# Patient Record
Sex: MALE | Race: WHITE | Employment: PART TIME | URBAN - METROPOLITAN AREA
[De-identification: names, ages, dates, MRNs, and addresses within clinical notes are randomized per-mention and may not be internally consistent; named-entity substitution may affect disease eponyms.]

---

## 2022-08-06 ENCOUNTER — APPOINTMENT (OUTPATIENT)
Dept: CT IMAGING | Age: 37
End: 2022-08-06
Payer: MEDICARE

## 2022-08-06 ENCOUNTER — HOSPITAL ENCOUNTER (EMERGENCY)
Age: 37
Discharge: HOME | End: 2022-08-06
Attending: STUDENT IN AN ORGANIZED HEALTH CARE EDUCATION/TRAINING PROGRAM
Payer: MEDICARE

## 2022-08-06 ENCOUNTER — APPOINTMENT (OUTPATIENT)
Dept: GENERAL RADIOLOGY | Age: 37
End: 2022-08-06
Payer: MEDICARE

## 2022-08-06 VITALS
OXYGEN SATURATION: 95 % | SYSTOLIC BLOOD PRESSURE: 141 MMHG | TEMPERATURE: 98.2 F | HEIGHT: 72 IN | RESPIRATION RATE: 18 BRPM | DIASTOLIC BLOOD PRESSURE: 98 MMHG | WEIGHT: 185 LBS | HEART RATE: 73 BPM | BODY MASS INDEX: 25.06 KG/M2

## 2022-08-06 DIAGNOSIS — R07.2 PRECORDIAL PAIN: Primary | ICD-10-CM

## 2022-08-06 DIAGNOSIS — F14.90 COCAINE USE: ICD-10-CM

## 2022-08-06 DIAGNOSIS — E87.6 HYPOKALEMIA: ICD-10-CM

## 2022-08-06 DIAGNOSIS — R10.13 ABDOMINAL PAIN, EPIGASTRIC: ICD-10-CM

## 2022-08-06 LAB
A/G RATIO: 2.2 (ref 1.1–2.2)
ALBUMIN SERPL-MCNC: 5.4 G/DL (ref 3.4–5)
ALP BLD-CCNC: 84 U/L (ref 40–129)
ALT SERPL-CCNC: 14 U/L (ref 10–40)
AMPHETAMINE SCREEN, URINE: ABNORMAL
ANION GAP SERPL CALCULATED.3IONS-SCNC: 23 MMOL/L (ref 3–16)
AST SERPL-CCNC: 17 U/L (ref 15–37)
BARBITURATE SCREEN URINE: ABNORMAL
BASE EXCESS VENOUS: -3.2 MMOL/L (ref -2–3)
BASOPHILS ABSOLUTE: 0.3 K/UL (ref 0–0.2)
BASOPHILS RELATIVE PERCENT: 2.6 %
BENZODIAZEPINE SCREEN, URINE: ABNORMAL
BILIRUB SERPL-MCNC: 0.6 MG/DL (ref 0–1)
BUN BLDV-MCNC: 10 MG/DL (ref 7–20)
CALCIUM SERPL-MCNC: 10.3 MG/DL (ref 8.3–10.6)
CANNABINOID SCREEN URINE: POSITIVE
CARBOXYHEMOGLOBIN: 3.8 % (ref 0–1.5)
CHLORIDE BLD-SCNC: 103 MMOL/L (ref 99–110)
CO2: 16 MMOL/L (ref 21–32)
COCAINE METABOLITE SCREEN URINE: POSITIVE
CREAT SERPL-MCNC: 0.9 MG/DL (ref 0.9–1.3)
EOSINOPHILS ABSOLUTE: 0 K/UL (ref 0–0.6)
EOSINOPHILS RELATIVE PERCENT: 0.1 %
GFR AFRICAN AMERICAN: >60
GFR NON-AFRICAN AMERICAN: >60
GLUCOSE BLD-MCNC: 118 MG/DL (ref 70–99)
HCO3 VENOUS: 18.2 MMOL/L (ref 24–28)
HCT VFR BLD CALC: 47.1 % (ref 40.5–52.5)
HEMOGLOBIN, VEN, REDUCED: 3.8 %
HEMOGLOBIN: 16.2 G/DL (ref 13.5–17.5)
INFLUENZA A: NOT DETECTED
INFLUENZA B: NOT DETECTED
LACTIC ACID: 1.4 MMOL/L (ref 0.4–2)
LACTIC ACID: 7.7 MMOL/L (ref 0.4–2)
LIPASE: 85 U/L (ref 13–60)
LYMPHOCYTES ABSOLUTE: 1.7 K/UL (ref 1–5.1)
LYMPHOCYTES RELATIVE PERCENT: 14.3 %
Lab: ABNORMAL
MCH RBC QN AUTO: 28.3 PG (ref 26–34)
MCHC RBC AUTO-ENTMCNC: 34.4 G/DL (ref 31–36)
MCV RBC AUTO: 82.4 FL (ref 80–100)
METHADONE SCREEN, URINE: ABNORMAL
METHEMOGLOBIN VENOUS: 0 % (ref 0–1.5)
MONOCYTES ABSOLUTE: 0.4 K/UL (ref 0–1.3)
MONOCYTES RELATIVE PERCENT: 3.4 %
NEUTROPHILS ABSOLUTE: 9.4 K/UL (ref 1.7–7.7)
NEUTROPHILS RELATIVE PERCENT: 79.6 %
O2 SAT, VEN: 96 %
OPIATE SCREEN URINE: ABNORMAL
OXYCODONE URINE: ABNORMAL
PCO2, VEN: 24.9 MMHG (ref 41–51)
PDW BLD-RTO: 14.2 % (ref 12.4–15.4)
PH UA: 6
PH VENOUS: 7.47 (ref 7.35–7.45)
PHENCYCLIDINE SCREEN URINE: ABNORMAL
PLATELET # BLD: 312 K/UL (ref 135–450)
PMV BLD AUTO: 7.3 FL (ref 5–10.5)
PO2, VEN: 70.9 MMHG (ref 25–40)
POTASSIUM SERPL-SCNC: 3.3 MMOL/L (ref 3.5–5.1)
PROPOXYPHENE SCREEN: ABNORMAL
RBC # BLD: 5.72 M/UL (ref 4.2–5.9)
SALICYLATE, SERUM: <0.3 MG/DL (ref 15–30)
SARS-COV-2 RNA, RT PCR: NOT DETECTED
SODIUM BLD-SCNC: 142 MMOL/L (ref 136–145)
TCO2 CALC VENOUS: 19 MMOL/L
TOTAL CK: 81 U/L (ref 39–308)
TOTAL PROTEIN: 7.9 G/DL (ref 6.4–8.2)
TROPONIN: <0.01 NG/ML
TROPONIN: <0.01 NG/ML
WBC # BLD: 11.8 K/UL (ref 4–11)

## 2022-08-06 PROCEDURE — 82550 ASSAY OF CK (CPK): CPT

## 2022-08-06 PROCEDURE — 87636 SARSCOV2 & INF A&B AMP PRB: CPT

## 2022-08-06 PROCEDURE — 71045 X-RAY EXAM CHEST 1 VIEW: CPT

## 2022-08-06 PROCEDURE — 93005 ELECTROCARDIOGRAM TRACING: CPT | Performed by: STUDENT IN AN ORGANIZED HEALTH CARE EDUCATION/TRAINING PROGRAM

## 2022-08-06 PROCEDURE — 80053 COMPREHEN METABOLIC PANEL: CPT

## 2022-08-06 PROCEDURE — 82803 BLOOD GASES ANY COMBINATION: CPT

## 2022-08-06 PROCEDURE — 83690 ASSAY OF LIPASE: CPT

## 2022-08-06 PROCEDURE — 6370000000 HC RX 637 (ALT 250 FOR IP): Performed by: STUDENT IN AN ORGANIZED HEALTH CARE EDUCATION/TRAINING PROGRAM

## 2022-08-06 PROCEDURE — 36415 COLL VENOUS BLD VENIPUNCTURE: CPT

## 2022-08-06 PROCEDURE — 83605 ASSAY OF LACTIC ACID: CPT

## 2022-08-06 PROCEDURE — 74177 CT ABD & PELVIS W/CONTRAST: CPT

## 2022-08-06 PROCEDURE — 99285 EMERGENCY DEPT VISIT HI MDM: CPT

## 2022-08-06 PROCEDURE — 80179 DRUG ASSAY SALICYLATE: CPT

## 2022-08-06 PROCEDURE — 2580000003 HC RX 258: Performed by: STUDENT IN AN ORGANIZED HEALTH CARE EDUCATION/TRAINING PROGRAM

## 2022-08-06 PROCEDURE — 85025 COMPLETE CBC W/AUTO DIFF WBC: CPT

## 2022-08-06 PROCEDURE — 6360000004 HC RX CONTRAST MEDICATION: Performed by: STUDENT IN AN ORGANIZED HEALTH CARE EDUCATION/TRAINING PROGRAM

## 2022-08-06 PROCEDURE — 84484 ASSAY OF TROPONIN QUANT: CPT

## 2022-08-06 PROCEDURE — 80307 DRUG TEST PRSMV CHEM ANLYZR: CPT

## 2022-08-06 RX ORDER — SODIUM CHLORIDE, SODIUM LACTATE, POTASSIUM CHLORIDE, AND CALCIUM CHLORIDE .6; .31; .03; .02 G/100ML; G/100ML; G/100ML; G/100ML
1000 INJECTION, SOLUTION INTRAVENOUS ONCE
Status: COMPLETED | OUTPATIENT
Start: 2022-08-06 | End: 2022-08-06

## 2022-08-06 RX ORDER — POTASSIUM CHLORIDE 20 MEQ/1
40 TABLET, EXTENDED RELEASE ORAL ONCE
Status: COMPLETED | OUTPATIENT
Start: 2022-08-06 | End: 2022-08-06

## 2022-08-06 RX ORDER — FAMOTIDINE 20 MG/1
20 TABLET, FILM COATED ORAL ONCE
Status: COMPLETED | OUTPATIENT
Start: 2022-08-06 | End: 2022-08-06

## 2022-08-06 RX ORDER — SODIUM CHLORIDE, SODIUM LACTATE, POTASSIUM CHLORIDE, AND CALCIUM CHLORIDE .6; .31; .03; .02 G/100ML; G/100ML; G/100ML; G/100ML
500 INJECTION, SOLUTION INTRAVENOUS ONCE
Status: COMPLETED | OUTPATIENT
Start: 2022-08-06 | End: 2022-08-06

## 2022-08-06 RX ORDER — POTASSIUM CHLORIDE 1.5 G/1.77G
40 POWDER, FOR SOLUTION ORAL ONCE
Status: DISCONTINUED | OUTPATIENT
Start: 2022-08-06 | End: 2022-08-06

## 2022-08-06 RX ORDER — ASPIRIN 81 MG/1
324 TABLET, CHEWABLE ORAL ONCE
Status: COMPLETED | OUTPATIENT
Start: 2022-08-06 | End: 2022-08-06

## 2022-08-06 RX ADMIN — FAMOTIDINE 20 MG: 20 TABLET ORAL at 18:09

## 2022-08-06 RX ADMIN — SODIUM CHLORIDE, SODIUM LACTATE, POTASSIUM CHLORIDE, AND CALCIUM CHLORIDE 1000 ML: .6; .31; .03; .02 INJECTION, SOLUTION INTRAVENOUS at 14:21

## 2022-08-06 RX ADMIN — IOPAMIDOL 75 ML: 755 INJECTION, SOLUTION INTRAVENOUS at 15:15

## 2022-08-06 RX ADMIN — SODIUM CHLORIDE, SODIUM LACTATE, POTASSIUM CHLORIDE, AND CALCIUM CHLORIDE 500 ML: .6; .31; .03; .02 INJECTION, SOLUTION INTRAVENOUS at 13:27

## 2022-08-06 RX ADMIN — ASPIRIN 324 MG: 81 TABLET, CHEWABLE ORAL at 13:27

## 2022-08-06 RX ADMIN — POTASSIUM CHLORIDE 40 MEQ: 20 TABLET, EXTENDED RELEASE ORAL at 18:34

## 2022-08-06 ASSESSMENT — ENCOUNTER SYMPTOMS
ABDOMINAL PAIN: 0
COUGH: 0
VOMITING: 0
NAUSEA: 0
SORE THROAT: 0
SHORTNESS OF BREATH: 1

## 2022-08-06 NOTE — ED NOTES
Seizure precautions in place. Side rails padded on both sides of stretcher. Oxygen and suction available at head of bed. Will continue seizure precautions for patient safety.       Josh Asencio RN  08/06/22 1792

## 2022-08-06 NOTE — ED NOTES
Patient prepared for and ready to be discharged. Patient discharged at this time in no acute distress after verbalizing understanding of discharge instructions. Patient left after receiving After Visit Summary instructions.       Amadou Faulkner RN  08/06/22 4246

## 2022-08-06 NOTE — ED NOTES
Sitter at bedside due to pt rolling around and may climb out of bed.      Isabel Potts RN  08/06/22 [id number]

## 2022-08-06 NOTE — ED PROVIDER NOTES
1 [facility]  EMERGENCY DEPARTMENT ENCOUNTER          ATTENDING PHYSICIAN NOTE       Date of evaluation: 8/6/2022    Chief Complaint     Fever and Hypertension      History of Present Illness     Hugo Mchugh is a 43 y.o. male with history of HTN not currently on medication and opiate use disorder presenting with chest pain, trouble breathing and generalized body pain with fever. Patient reports he was in his normal state of health until today at 10 AM when he took a dose of Suboxone from a friend. Patient reports that he began feeling left sided chest pain and pressure with associated shortness of breath. Patient reports the pain is localized, denies radiation to back neck or arms, denies associated nausea, vomiting, diaphoresis. Denies prior episodes of similar pain. Denies exacerbating or alleviating factors. He also states that he has felt hot as if he had a fever and has burning on his skin with no associated rash. Patient states that he only took Suboxone and last used fentanyl 36 hours ago. He denies any alcohol use, cocaine use or other substance use. Denies ever using IV drugs. Denies any upper respiratory symptoms, abdominal or GI symptoms. Denies any known sick contacts. He reports being vaccinated but not boosted for COVID. Review of Systems     Review of Systems   Constitutional:  Positive for fever. Negative for chills. HENT:  Negative for congestion and sore throat. Eyes:  Negative for visual disturbance. Respiratory:  Positive for shortness of breath. Negative for cough. Cardiovascular:  Positive for chest pain. Negative for leg swelling. Gastrointestinal:  Negative for abdominal pain, nausea and vomiting. Genitourinary:  Negative for dysuria. Musculoskeletal:  Negative for arthralgias and myalgias. Skin:  Negative for rash. Neurological:  Negative for syncope. Psychiatric/Behavioral:  Negative for confusion.       Past Medical, Surgical, Family, and Social History     He has no past medical history on file. He has a past surgical history that includes Tonsillectomy and [place] tooth extraction. His family history is not on file. He reports that he has been smoking cigarettes. He has been smoking an average of .2 packs per day. He has never used smokeless tobacco. He reports current drug use. Drug: Opiates . He reports that he does not drink alcohol. Medications     Discharge Medication List as of 8/6/2022  5:44 PM        CONTINUE these medications which have NOT CHANGED    Details   ondansetron (ZOFRAN ODT) 4 MG disintegrating tablet Take 1 tablet by mouth every 8 hours as needed for Nausea, Disp-20 tablet, R-0      loperamide (IMODIUM) 2 MG capsule Take 2 mg by mouth 4 times daily as needed for Diarrhea      acetaminophen (TYLENOL) 500 MG tablet Take 500 mg by mouth every 6 hours as needed for Pain             Allergies     He has No Known Allergies. Physical Exam     INITIAL VITALS: BP: (!) 141/98, Temp: 100 °F (37.8 °C), Heart Rate: 93, Resp: 18, SpO2: 100 %   Physical Exam  GENERAL: Awake, alert. Uncomfortable appering, rolling around on stretcher kicking with eyes closed  HEENT: Normocephalic, atraumatic. Conjunctiva clear, EOMI, no eye drainage. External ear normal. Nares patent with no drainage. Mucous membranes moist. Neck is supple, with full ROM. CARDIOVASCULAR: RRR, extremities warm and appear well-perfused. no peripheral edema. Strong symmetric radial pulses  RESPIRATORY: No increased WOB, good air movement, breath sounds CTAB, no wheezes, rhonchi, or crackles noted. GI/ABDOMEN: Soft, non-distended, non-tender, normal bowel sounds, no rebound, no guarding. MSK/EXTR: No deformities or cyanosis noted. SKIN: Warm and dry, no rashes. NEURO: No focal neurologic deficits, moving all four extremities.  Alert and oriented  PSYCH: Normal affect, agitated with medical questions during interview, restless      Diagnostic Results     EKG   EKG reviewed and interpreted by me shows sinus arrhythmia with rate of 91, narrow QRS, normal axis with significant baseline as patient was not able to stay still during EKG with no significant ST elevations or depressions with prolonged QT interval.    RADIOLOGY:  CT ABDOMEN PELVIS W IV CONTRAST Additional Contrast? None   Final Result      Moderate nonspecific distention of proximal small bowel loops with air-fluid levels. Focal ileus or early mechanical obstruction or potential considerations. Otherwise no acute abnormality identified.           XR CHEST PORTABLE   Final Result      No acute disease          LABS:   Results for orders placed or performed during the hospital encounter of 08/06/22   COVID-19 & Influenza Combo    Specimen: Nasopharyngeal Swab   Result Value Ref Range    SARS-CoV-2 RNA, RT PCR NOT DETECTED NOT DETECTED    INFLUENZA A NOT DETECTED NOT DETECTED    INFLUENZA B NOT DETECTED NOT DETECTED   CBC with Auto Differential   Result Value Ref Range    WBC 11.8 (H) 4.0 - 11.0 K/uL    RBC 5.72 4.20 - 5.90 M/uL    Hemoglobin 16.2 13.5 - 17.5 g/dL    Hematocrit 47.1 40.5 - 52.5 %    MCV 82.4 80.0 - 100.0 fL    MCH 28.3 26.0 - 34.0 pg    MCHC 34.4 31.0 - 36.0 g/dL    RDW 14.2 12.4 - 15.4 %    Platelets 730 433 - 264 K/uL    MPV 7.3 5.0 - 10.5 fL    Neutrophils % 79.6 %    Lymphocytes % 14.3 %    Monocytes % 3.4 %    Eosinophils % 0.1 %    Basophils % 2.6 %    Neutrophils Absolute 9.4 (H) 1.7 - 7.7 K/uL    Lymphocytes Absolute 1.7 1.0 - 5.1 K/uL    Monocytes Absolute 0.4 0.0 - 1.3 K/uL    Eosinophils Absolute 0.0 0.0 - 0.6 K/uL    Basophils Absolute 0.3 (H) 0.0 - 0.2 K/uL   Troponin   Result Value Ref Range    Troponin <0.01 <0.01 ng/mL   CMP   Result Value Ref Range    Sodium 142 136 - 145 mmol/L    Potassium 3.3 (L) 3.5 - 5.1 mmol/L    Chloride 103 99 - 110 mmol/L    CO2 16 (L) 21 - 32 mmol/L    Anion Gap 23 (H) 3 - 16    Glucose 118 (H) 70 - 99 mg/dL    BUN 10 7 - 20 mg/dL    Creatinine 0.9 0.9 - 1.3 mg/dL    GFR Non-African American >60 >60    GFR African American >60 >60    Calcium 10.3 8.3 - 10.6 mg/dL    Total Protein 7.9 6.4 - 8.2 g/dL    Albumin 5.4 (H) 3.4 - 5.0 g/dL    Albumin/Globulin Ratio 2.2 1.1 - 2.2    Total Bilirubin 0.6 0.0 - 1.0 mg/dL    Alkaline Phosphatase 84 40 - 129 U/L    ALT 14 10 - 40 U/L    AST 17 15 - 37 U/L   Lactic Acid   Result Value Ref Range    Lactic Acid 7.7 (HH) 0.4 - 2.0 mmol/L   Blood gas, venous (Lab)   Result Value Ref Range    pH, Osmani 7.472 (H) 7.350 - 7.450    pCO2, Osmani 24.9 (L) 41.0 - 51.0 mmHg    pO2, Osmani 70.9 (H) 25.0 - 40.0 mmHg    HCO3, Venous 18.2 (L) 24.0 - 28.0 mmol/L    Base Excess, Osmani -3.2 (L) -2.0 - 3.0 mmol/L    O2 Sat, Osmani 96 Not established %    Carboxyhemoglobin 3.8 (H) 0.0 - 1.5 %    MetHgb, Osmani 0.0 0.0 - 1.5 %    TC02 (Calc), Osmani 19 mmol/L    Hemoglobin, Osmani, Reduced 3.80 %   Urine Drug Screen   Result Value Ref Range    Amphetamine Screen, Urine Neg Negative <1000ng/mL    Barbiturate Screen, Ur Neg Negative <200 ng/mL    Benzodiazepine Screen, Urine Neg Negative <200 ng/mL    Cannabinoid Scrn, Ur POSITIVE (A) Negative <50 ng/mL    Cocaine Metabolite Screen, Urine POSITIVE (A) Negative <300 ng/mL    Opiate Scrn, Ur Neg Negative <300 ng/mL    PCP Screen, Urine Neg Negative <25 ng/mL    Methadone Screen, Urine Neg Negative <300 ng/mL    Propoxyphene Scrn, Ur Neg Negative <300 ng/mL    Oxycodone Urine Neg Negative <100 ng/ml    pH, UA 6.0     Drug Screen Comment: see below    Lipase   Result Value Ref Range    Lipase 85.0 (H) 13.0 - 60.0 U/L   CK   Result Value Ref Range    Total CK 81 39 - 052 U/L   Salicylate   Result Value Ref Range    Salicylate, Serum <8.1 (L) 15.0 - 30.0 mg/dL   Lactic Acid   Result Value Ref Range    Lactic Acid 1.4 0.4 - 2.0 mmol/L   Troponin   Result Value Ref Range    Troponin <0.01 <0.01 ng/mL       ED BEDSIDE ULTRASOUND:  No results found.     RECENT VITALS:  BP:  (unable to get blood pressure on patient because it was too painful for him/ screamed to take it off before it was done reading blood pressure),Temp: 98.2 °F (36.8 °C), Heart Rate: 73, Resp: 18, SpO2: 95 %     Procedures       ED Course     Nursing Notes, Past Medical Hx, Past Surgical Hx, Social Hx,Allergies, and Family Hx were reviewed. ED Course as of 08/07/22 0222   Sat Aug 06, 2022   1322 WBC(!): 11.8 [ED]   1322 Hemoglobin Quant: 16.2 [ED]   1322 Platelet Count: 601 [ED]   1327 pH, Osmani(!): 7.472 [ED]   9660 Carboxyhemoglobin(!): 3.8  Patient smokes tobacco daily [ED]   1351 Troponin: <0.01 [ED]   1351 Lactic Acid(!!): 7.7 [ED]   1351 Lipase(!): 85.0 [ED]   1352 Anion Gap(!): 23 [ED]   1352 Creatinine: 0.9 [ED]   1353 Alk Phos: 84 [ED]   7021 ALT: 14 [ED]   6654 AST: 17 [ED]   1429 Total CK: 81 [ED]   1444 Lipase(!): 61.0 [ED]   0158 Salicylate, Serum(!): <9.9 [ED]   1551 Lactic Acid: 1.4 [ED]   1737 Cannabinoid Scrn, Ur(!): POSITIVE [ED]   1737 Cocaine Metabolite Screen, Urine(!): POSITIVE [ED]      ED Course User Index  [ED] Alireza Carrera MD       patient was given the following medications:  Orders Placed This Encounter   Medications    aspirin chewable tablet 324 mg    lactated ringers bolus    lactated ringers bolus    iopamidol (ISOVUE-370) 76 % injection 75 mL    DISCONTD: potassium chloride (KLOR-CON) packet 40 mEq    famotidine (PEPCID) tablet 20 mg    DISCONTD: potassium bicarb-citric acid (EFFER-K) effervescent tablet 40 mEq    potassium chloride (KLOR-CON M) extended release tablet 40 mEq       CONSULTS:  None    MEDICAL DECISIONMAKING / ASSESSMENT / Godwin Grace is a 43 y.o. male with history of HTN, tobacco use and opiate use disorder presenting with chest pain, trouble breathing and fever. Uncomfortable appearing male in no acute distress. Differential concerning for but not limited to ACS, arrhythmia, COVID, pneumonia, pneumothorax, dissection, substance use, substance withdrawal, pancreatitis.     IV access was obtained and labs EKG and chest x-ray were ordered. Patient was given aspirin on arrival due to ongoing chest discomfort. Patient given IV fluids for her tachycardia. Labs notable for initial lactate of 7.7 with repeat after fluids of 1.4, UDS positive for cocaine and cannabis, troponin negative x2, COVID and flu negative. Mild hypokalemia to 3.3 for which patient was given oral potassium supplementation. Aspirin level was sent due to mixed acid-base status with concern for ingestion and resulted as negative. Mildly elevated lipase with epigastric discomfort, CT scan was obtained and negative for acute abnormality. Repeat assessment, patient had resolution of symptoms with significant improvement in heart rate and appeared comfortable seated in stretcher. Results were discussed with the patient given that he was now well-appearing with stable vitals, reassuring exam, improvement in his lactate with reassuring cardiac evaluation and heart score of 2, patient was discharged home with recommendation to follow-up with his primary care doctor for repeat assessment of his potassium and for repeat assessment of abdomen. He was advised to return to the ER for new or worsening symptoms. Patient expressed understanding and was amenable to plan. All questions were answered prior to discharge. Clinical Impression     1. Precordial pain    2. Abdominal pain, epigastric    3. Hypokalemia    4. Cocaine use        Disposition     PATIENT REFERRED TO:  No follow-up provider specified.     DISCHARGE MEDICATIONS:  Discharge Medication List as of 8/6/2022  5:44 PM          DISPOSITION Decision To Discharge 08/06/2022 06:36:59 PM        Elton Cardona MD  08/07/22 0222

## 2022-08-06 NOTE — DISCHARGE INSTRUCTIONS
-You were given fluids while in the emergency department with significant improvement in your symptoms, heart rate and lab tests  -The blood test to look for heart damage was negative in the emergency department  -CT scan of your abdomen did not show any acute abnormality  -Your potassium was slightly low in the emergency department and you were given oral potassium to increase it  -Please call the number on the back of your insurance card to set up follow-up appointment with a primary care doctor for repeat assessment of your potassium and abdominal pain  -Return to the ER for new or worsening symptoms including chest pain, trouble breathing, lightheadedness, syncope, worsening abdominal pain, signs of dehydration or other symptoms that are concerning to you

## 2022-08-07 LAB
EKG ATRIAL RATE: 91 BPM
EKG DIAGNOSIS: NORMAL
EKG P AXIS: 66 DEGREES
EKG P-R INTERVAL: 112 MS
EKG Q-T INTERVAL: 406 MS
EKG QRS DURATION: 76 MS
EKG QTC CALCULATION (BAZETT): 499 MS
EKG R AXIS: 79 DEGREES
EKG T AXIS: 60 DEGREES
EKG VENTRICULAR RATE: 91 BPM

## 2024-02-03 ENCOUNTER — HOSPITAL ENCOUNTER (EMERGENCY)
Age: 39
Discharge: HOME | End: 2024-02-03
Attending: EMERGENCY MEDICINE
Payer: MEDICAID

## 2024-02-03 VITALS
BODY MASS INDEX: 22.7 KG/M2 | WEIGHT: 171.3 LBS | HEART RATE: 74 BPM | TEMPERATURE: 98.6 F | SYSTOLIC BLOOD PRESSURE: 178 MMHG | OXYGEN SATURATION: 99 % | DIASTOLIC BLOOD PRESSURE: 98 MMHG | RESPIRATION RATE: 16 BRPM | HEIGHT: 73 IN

## 2024-02-03 DIAGNOSIS — I10 ASYMPTOMATIC HYPERTENSION: Primary | ICD-10-CM

## 2024-02-03 PROCEDURE — 93005 ELECTROCARDIOGRAM TRACING: CPT | Performed by: EMERGENCY MEDICINE

## 2024-02-03 PROCEDURE — 99283 EMERGENCY DEPT VISIT LOW MDM: CPT

## 2024-02-03 RX ORDER — METHADONE HYDROCHLORIDE 10 MG/1
80 TABLET ORAL DAILY
COMMUNITY

## 2024-02-03 ASSESSMENT — PAIN - FUNCTIONAL ASSESSMENT
PAIN_FUNCTIONAL_ASSESSMENT: NONE - DENIES PAIN
PAIN_FUNCTIONAL_ASSESSMENT: NONE - DENIES PAIN

## 2024-02-03 NOTE — ED PROVIDER NOTES
[facility] EMERGENCY DEPARTMENT  eMERGENCY dEPARTMENT eNCOUnter      Pt Name: Edvin Heck  MRN: [id number]  Birthdate 1985  Date of evaluation: 2/3/2024  Provider: Jamari Hernandez MD  PCP: [facility] JIL-DARRICK ARZATE      CHIEF COMPLAINT       Chief Complaint   Patient presents with    Hypertension       HISTORY OFPRESENT ILLNESS   (Location/Symptom, Timing/Onset, Context/Setting, Quality, Duration, Modifying Factors,Severity)  Note limiting factors.     Edvni Heck is a 38 y.o. male presents with complaints that his dad was concerned because his blood pressure was high and that his pulse was high he says that he supposed to be taking lisinopril but he does not take it he has not taken it for several weeks he denies any headache chest pain dizziness denies any palpitations    Nursing Notes were all reviewed and agreed with or any disagreements were addressed  in the HPI.    REVIEW OF SYSTEMS    (2-9 systems for level 4, 10 or more for level 5)     Review of Systems    Positives and Pertinent negatives as per HPI.  Except as noted above in the ROS, all other systems were reviewed andnegative.       PASTMEDICAL HISTORY   No past medical history on file.      SURGICAL HISTORY       Past Surgical History:   Procedure Laterality Date    TONSILLECTOMY      WISDOM TOOTH EXTRACTION           CURRENT MEDICATIONS       Previous Medications    ACETAMINOPHEN (TYLENOL) 500 MG TABLET    Take 1 tablet by mouth every 6 hours as needed for Pain    LOPERAMIDE (IMODIUM) 2 MG CAPSULE    Take 2 mg by mouth 4 times daily as needed for Diarrhea    METHADONE (DOLOPHINE) 10 MG TABLET    Take 8 tablets by mouth Daily. Max Daily Amount: 80 mg    ONDANSETRON (ZOFRAN ODT) 4 MG DISINTEGRATING TABLET    Take 1 tablet by mouth every 8 hours as needed for Nausea       ALLERGIES     Patient has no known allergies.    FAMILY HISTORY     No family history on file.       SOCIAL HISTORY       Social  MD  02/03/24 4125

## 2024-02-04 LAB
EKG ATRIAL RATE: 92 BPM
EKG DIAGNOSIS: NORMAL
EKG P AXIS: 58 DEGREES
EKG P-R INTERVAL: 128 MS
EKG Q-T INTERVAL: 380 MS
EKG QRS DURATION: 82 MS
EKG QTC CALCULATION (BAZETT): 469 MS
EKG R AXIS: 65 DEGREES
EKG T AXIS: 46 DEGREES
EKG VENTRICULAR RATE: 92 BPM

## 2024-02-04 NOTE — ED NOTES
Pt given AVS and verbalized understanding of instructions. Pt will follow up as indicated. Prescriptions given and medication education completed. Pt ambulated to lobby without assistance, respirations appear even and unlabored. No distress noted.

## 2024-02-05 PROCEDURE — 93010 ELECTROCARDIOGRAM REPORT: CPT | Performed by: INTERNAL MEDICINE

## 2024-08-07 ENCOUNTER — HOSPITAL ENCOUNTER (EMERGENCY)
Age: 39
Discharge: HOME | End: 2024-08-07
Attending: STUDENT IN AN ORGANIZED HEALTH CARE EDUCATION/TRAINING PROGRAM
Payer: MEDICAID

## 2024-08-07 ENCOUNTER — APPOINTMENT (OUTPATIENT)
Dept: GENERAL RADIOLOGY | Age: 39
End: 2024-08-07
Payer: MEDICAID

## 2024-08-07 VITALS
SYSTOLIC BLOOD PRESSURE: 152 MMHG | BODY MASS INDEX: 24.08 KG/M2 | RESPIRATION RATE: 17 BRPM | HEART RATE: 95 BPM | DIASTOLIC BLOOD PRESSURE: 102 MMHG | OXYGEN SATURATION: 99 % | WEIGHT: 181.66 LBS | TEMPERATURE: 97.8 F | HEIGHT: 73 IN

## 2024-08-07 DIAGNOSIS — K59.00 CONSTIPATION, UNSPECIFIED CONSTIPATION TYPE: Primary | ICD-10-CM

## 2024-08-07 LAB
ALBUMIN SERPL-MCNC: 4.3 G/DL (ref 3.4–5)
ALP SERPL-CCNC: 90 U/L (ref 40–129)
ALT SERPL-CCNC: 17 U/L (ref 10–40)
ANION GAP SERPL CALCULATED.3IONS-SCNC: 10 MMOL/L (ref 3–16)
AST SERPL-CCNC: 17 U/L (ref 15–37)
BASOPHILS # BLD: 0.1 K/UL (ref 0–0.2)
BASOPHILS NFR BLD: 0.9 %
BILIRUB DIRECT SERPL-MCNC: <0.2 MG/DL (ref 0–0.3)
BILIRUB INDIRECT SERPL-MCNC: NORMAL MG/DL (ref 0–1)
BILIRUB SERPL-MCNC: 0.4 MG/DL (ref 0–1)
BILIRUB UR QL STRIP.AUTO: NEGATIVE
BUN SERPL-MCNC: 9 MG/DL (ref 7–20)
CALCIUM SERPL-MCNC: 9.4 MG/DL (ref 8.3–10.6)
CHLORIDE SERPL-SCNC: 103 MMOL/L (ref 99–110)
CLARITY UR: CLEAR
CO2 SERPL-SCNC: 29 MMOL/L (ref 21–32)
COLOR UR: YELLOW
CREAT SERPL-MCNC: 0.8 MG/DL (ref 0.9–1.3)
DEPRECATED RDW RBC AUTO: 13.7 % (ref 12.4–15.4)
EKG ATRIAL RATE: 59 BPM
EKG DIAGNOSIS: NORMAL
EKG P AXIS: 61 DEGREES
EKG P-R INTERVAL: 128 MS
EKG Q-T INTERVAL: 448 MS
EKG QRS DURATION: 92 MS
EKG QTC CALCULATION (BAZETT): 443 MS
EKG R AXIS: 81 DEGREES
EKG T AXIS: 55 DEGREES
EKG VENTRICULAR RATE: 59 BPM
EOSINOPHIL # BLD: 0.3 K/UL (ref 0–0.6)
EOSINOPHIL NFR BLD: 3.2 %
GFR SERPLBLD CREATININE-BSD FMLA CKD-EPI: >90 ML/MIN/{1.73_M2}
GLUCOSE SERPL-MCNC: 108 MG/DL (ref 70–99)
GLUCOSE UR STRIP.AUTO-MCNC: NEGATIVE MG/DL
HCT VFR BLD AUTO: 42.3 % (ref 40.5–52.5)
HGB BLD-MCNC: 14.4 G/DL (ref 13.5–17.5)
HGB UR QL STRIP.AUTO: NEGATIVE
KETONES UR STRIP.AUTO-MCNC: NEGATIVE MG/DL
LEUKOCYTE ESTERASE UR QL STRIP.AUTO: NEGATIVE
LIPASE SERPL-CCNC: 12 U/L (ref 13–60)
LYMPHOCYTES # BLD: 2.7 K/UL (ref 1–5.1)
LYMPHOCYTES NFR BLD: 29.6 %
MCH RBC QN AUTO: 28.4 PG (ref 26–34)
MCHC RBC AUTO-ENTMCNC: 34.1 G/DL (ref 31–36)
MCV RBC AUTO: 83.2 FL (ref 80–100)
MONOCYTES # BLD: 0.6 K/UL (ref 0–1.3)
MONOCYTES NFR BLD: 7 %
NEUTROPHILS # BLD: 5.4 K/UL (ref 1.7–7.7)
NEUTROPHILS NFR BLD: 59.3 %
NITRITE UR QL STRIP.AUTO: NEGATIVE
PH UR STRIP.AUTO: 7 [PH] (ref 5–8)
PLATELET # BLD AUTO: 244 K/UL (ref 135–450)
PMV BLD AUTO: 7.3 FL (ref 5–10.5)
POTASSIUM SERPL-SCNC: 3.8 MMOL/L (ref 3.5–5.1)
PROT SERPL-MCNC: 7 G/DL (ref 6.4–8.2)
PROT UR STRIP.AUTO-MCNC: NEGATIVE MG/DL
RBC # BLD AUTO: 5.09 M/UL (ref 4.2–5.9)
SODIUM SERPL-SCNC: 142 MMOL/L (ref 136–145)
SP GR UR STRIP.AUTO: 1.01 (ref 1–1.03)
UA COMPLETE W REFLEX CULTURE PNL UR: NORMAL
UA DIPSTICK W REFLEX MICRO PNL UR: NORMAL
URN SPEC COLLECT METH UR: NORMAL
UROBILINOGEN UR STRIP-ACNC: 0.2 E.U./DL
WBC # BLD AUTO: 9.1 K/UL (ref 4–11)

## 2024-08-07 PROCEDURE — 85025 COMPLETE CBC W/AUTO DIFF WBC: CPT

## 2024-08-07 PROCEDURE — 99285 EMERGENCY DEPT VISIT HI MDM: CPT

## 2024-08-07 PROCEDURE — 80076 HEPATIC FUNCTION PANEL: CPT

## 2024-08-07 PROCEDURE — 93005 ELECTROCARDIOGRAM TRACING: CPT | Performed by: PHYSICIAN ASSISTANT

## 2024-08-07 PROCEDURE — 80048 BASIC METABOLIC PNL TOTAL CA: CPT

## 2024-08-07 PROCEDURE — 83690 ASSAY OF LIPASE: CPT

## 2024-08-07 PROCEDURE — 2580000003 HC RX 258: Performed by: PHYSICIAN ASSISTANT

## 2024-08-07 PROCEDURE — 81003 URINALYSIS AUTO W/O SCOPE: CPT

## 2024-08-07 PROCEDURE — 71046 X-RAY EXAM CHEST 2 VIEWS: CPT

## 2024-08-07 RX ORDER — SODIUM CHLORIDE, SODIUM LACTATE, POTASSIUM CHLORIDE, AND CALCIUM CHLORIDE .6; .31; .03; .02 G/100ML; G/100ML; G/100ML; G/100ML
1000 INJECTION, SOLUTION INTRAVENOUS ONCE
Status: COMPLETED | OUTPATIENT
Start: 2024-08-07 | End: 2024-08-07

## 2024-08-07 RX ADMIN — SODIUM CHLORIDE, POTASSIUM CHLORIDE, SODIUM LACTATE AND CALCIUM CHLORIDE 1000 ML: 600; 310; 30; 20 INJECTION, SOLUTION INTRAVENOUS at 16:55

## 2024-08-07 ASSESSMENT — ENCOUNTER SYMPTOMS
BLOOD IN STOOL: 0
EYE REDNESS: 0
CONSTIPATION: 1
SHORTNESS OF BREATH: 0
NAUSEA: 1
VOMITING: 1
ABDOMINAL PAIN: 1
DIARRHEA: 0

## 2024-08-07 ASSESSMENT — PAIN - FUNCTIONAL ASSESSMENT: PAIN_FUNCTIONAL_ASSESSMENT: 0-10

## 2024-08-07 ASSESSMENT — PAIN DESCRIPTION - FREQUENCY: FREQUENCY: CONTINUOUS

## 2024-08-07 ASSESSMENT — PAIN DESCRIPTION - PAIN TYPE: TYPE: ACUTE PAIN

## 2024-08-07 ASSESSMENT — PAIN DESCRIPTION - LOCATION: LOCATION: ABDOMEN

## 2024-08-07 ASSESSMENT — PAIN DESCRIPTION - DESCRIPTORS: DESCRIPTORS: TIGHTNESS

## 2024-08-07 ASSESSMENT — PAIN SCALES - GENERAL: PAINLEVEL_OUTOF10: 7

## 2024-08-07 ASSESSMENT — PAIN DESCRIPTION - ONSET: ONSET: ON-GOING

## 2024-08-07 NOTE — DISCHARGE INSTRUCTIONS
Drink one half bottle of magnesium citrate.  Wait 30 minutes.  If it does not cause you to have a bowel movement, then drink the remaining bottle.  This will cause abdominal cramping and diarrhea.  Do this when you have several hours at home with access to a bathroom.  Make sure you stay well-hydrated.  Going forward, you should plan to start taking MiraLAX once daily to help soften your stools to make it easier for you to have a bowel movement.    Follow-up with primary care.  Return to the emergency department with new or worsening symptoms.

## 2024-08-07 NOTE — ED PROVIDER NOTES
ED Attending Attestation Note     Date of evaluation: 8/7/2024    This patient was seen by the advance practice provider.  I have seen and examined the patient, agree with the workup, evaluation, management and diagnosis. The care plan has been discussed.  My assessment reveals a well-appearing 38-year-old male presenting with chief complaint of constipation.  Patient reports that he has not been able to have a normal bowel movement in the past 2 weeks, but has had multiple small bowel movements where he passed minimal stool.  No nausea or vomiting.  Eating and drinking without difficulty.  On examination, he is well-appearing with a soft, nontender abdomen.  Rectal examination performed by the BETTYE demonstrates no impacted stool ball.  Laboratory workup is reassuring with unremarkable CBC, BMP, LFTs, lipase.  Urinalysis unremarkable.  Will discharge with increase in bowel regimen with PCP follow-up .     Tristian Doyle MD  08/07/24 [id number]

## 2024-08-07 NOTE — ED PROVIDER NOTES
[facility]  EMERGENCY DEPARTMENT ENCOUNTER          PHYSICIAN ASSISTANT NOTE       Date of evaluation: 8/7/2024    Chief Complaint     Constipation (States no BM for 13 days  also headache and a little dizzy)      History of Present Illness     Edvin Heck is a 38 y.o. male with PMH of HTN not taking any medications, tobacco use, marijuana use who presents for evaluation of dizziness, constipation.  Patient states that approximately 3 weeks ago, he felt lightheaded for 3 to 4 days.  He denies LOC.  States that after few days, he had an episode where he got sweaty, nauseated and vomited once.  He states that he had couple small, painful solid bowel movements.  Since then, he states that he has not had a bowel movement.  He states he has been passing gas and last passed a pebble of stool yesterday. He has occasional passed some blood on toilet paper after straining but attributes this to known hemorrhoids. He states that his abdomen feels bloated and tight but denies pain.  He denies nausea or vomiting, difficulty urinating. No fevers, chills, chest pain, shortness of breath. No prior abdominal surgeries.    ASSESSMENT / PLAN  (MEDICAL DECISION MAKING)     INITIAL VITALS: BP: (!) 177/120, Temp: 97.8 °F (36.6 °C), Pulse: 55, Respirations: 17, SpO2: 100 %    Edvin Heck is a 38 y.o. male with PMH of HTN not taking any medications, tobacco use, marijuana use who presents for evaluation of dizziness, constipation.  Patient reports lightheadedness for 3 to 4 days about 3 weeks ago.  He states that this was followed by an episode of vomiting and he was able to pass a couple small solid stools.  However, he states he has not passed a decent sized bowel movement since.  He states he is passing gas however.  He denies pain but does feel abdominal bloating.  No nausea or vomiting. Has continued to feel a bit lightheaded. On exam, patient is hypertensive with otherwise normal vital signs.  He is well

## 2025-05-23 ENCOUNTER — APPOINTMENT (OUTPATIENT)
Dept: CT IMAGING | Age: 40
End: 2025-05-23
Payer: COMMERCIAL

## 2025-05-23 ENCOUNTER — APPOINTMENT (OUTPATIENT)
Dept: GENERAL RADIOLOGY | Age: 40
End: 2025-05-23
Payer: COMMERCIAL

## 2025-05-23 ENCOUNTER — HOSPITAL ENCOUNTER (EMERGENCY)
Age: 40
Discharge: HOME | End: 2025-05-24
Attending: EMERGENCY MEDICINE
Payer: COMMERCIAL

## 2025-05-23 DIAGNOSIS — T50.901A POLYSUBSTANCE OVERDOSE, ACCIDENTAL OR UNINTENTIONAL, INITIAL ENCOUNTER: Primary | ICD-10-CM

## 2025-05-23 LAB
ALBUMIN SERPL-MCNC: 4.2 G/DL (ref 3.4–5)
ALBUMIN/GLOB SERPL: 1.7 {RATIO} (ref 1.1–2.2)
ALP SERPL-CCNC: 70 U/L (ref 40–129)
ALT SERPL-CCNC: 14 U/L (ref 10–40)
ANION GAP SERPL CALCULATED.3IONS-SCNC: 12 MMOL/L (ref 3–16)
AST SERPL-CCNC: 20 U/L (ref 15–37)
BASOPHILS # BLD: 0.1 K/UL (ref 0–0.2)
BASOPHILS NFR BLD: 1.5 %
BILIRUB SERPL-MCNC: <0.2 MG/DL (ref 0–1)
BUN SERPL-MCNC: 10 MG/DL (ref 7–20)
CALCIUM SERPL-MCNC: 9.1 MG/DL (ref 8.3–10.6)
CHLORIDE SERPL-SCNC: 103 MMOL/L (ref 99–110)
CO2 SERPL-SCNC: 25 MMOL/L (ref 21–32)
CREAT SERPL-MCNC: 1.3 MG/DL (ref 0.9–1.3)
DEPRECATED RDW RBC AUTO: 14.1 % (ref 12.4–15.4)
EOSINOPHIL # BLD: 0.2 K/UL (ref 0–0.6)
EOSINOPHIL NFR BLD: 2.9 %
ETHANOLAMINE SERPL-MCNC: NORMAL MG/DL (ref 0–0.08)
GFR SERPLBLD CREATININE-BSD FMLA CKD-EPI: 70 ML/MIN/{1.73_M2}
GLUCOSE SERPL-MCNC: 104 MG/DL (ref 70–99)
HCT VFR BLD AUTO: 39.7 % (ref 40.5–52.5)
HGB BLD-MCNC: 13.5 G/DL (ref 13.5–17.5)
LACTATE BLDV-SCNC: 2.6 MMOL/L (ref 0.4–1.9)
LYMPHOCYTES # BLD: 2.4 K/UL (ref 1–5.1)
LYMPHOCYTES NFR BLD: 28.8 %
MCH RBC QN AUTO: 28.3 PG (ref 26–34)
MCHC RBC AUTO-ENTMCNC: 33.9 G/DL (ref 31–36)
MCV RBC AUTO: 83.3 FL (ref 80–100)
MONOCYTES # BLD: 0.6 K/UL (ref 0–1.3)
MONOCYTES NFR BLD: 7.1 %
NEUTROPHILS # BLD: 5.1 K/UL (ref 1.7–7.7)
NEUTROPHILS NFR BLD: 59.7 %
PLATELET # BLD AUTO: 152 K/UL (ref 135–450)
PMV BLD AUTO: 7.6 FL (ref 5–10.5)
POTASSIUM SERPL-SCNC: 4.8 MMOL/L (ref 3.5–5.1)
PROT SERPL-MCNC: 6.7 G/DL (ref 6.4–8.2)
RBC # BLD AUTO: 4.77 M/UL (ref 4.2–5.9)
SODIUM SERPL-SCNC: 140 MMOL/L (ref 136–145)
WBC # BLD AUTO: 8.5 K/UL (ref 4–11)

## 2025-05-23 PROCEDURE — 80053 COMPREHEN METABOLIC PANEL: CPT

## 2025-05-23 PROCEDURE — 83605 ASSAY OF LACTIC ACID: CPT

## 2025-05-23 PROCEDURE — 99285 EMERGENCY DEPT VISIT HI MDM: CPT

## 2025-05-23 PROCEDURE — 6360000004 HC RX CONTRAST MEDICATION: Performed by: EMERGENCY MEDICINE

## 2025-05-23 PROCEDURE — 85025 COMPLETE CBC W/AUTO DIFF WBC: CPT

## 2025-05-23 PROCEDURE — 2580000003 HC RX 258: Performed by: EMERGENCY MEDICINE

## 2025-05-23 PROCEDURE — 71045 X-RAY EXAM CHEST 1 VIEW: CPT

## 2025-05-23 PROCEDURE — 70450 CT HEAD/BRAIN W/O DYE: CPT

## 2025-05-23 PROCEDURE — 93005 ELECTROCARDIOGRAM TRACING: CPT | Performed by: EMERGENCY MEDICINE

## 2025-05-23 PROCEDURE — 96360 HYDRATION IV INFUSION INIT: CPT

## 2025-05-23 PROCEDURE — 70496 CT ANGIOGRAPHY HEAD: CPT

## 2025-05-23 PROCEDURE — 82077 ASSAY SPEC XCP UR&BREATH IA: CPT

## 2025-05-23 RX ORDER — IOPAMIDOL 755 MG/ML
75 INJECTION, SOLUTION INTRAVASCULAR
Status: COMPLETED | OUTPATIENT
Start: 2025-05-23 | End: 2025-05-23

## 2025-05-23 RX ORDER — 0.9 % SODIUM CHLORIDE 0.9 %
1000 INTRAVENOUS SOLUTION INTRAVENOUS ONCE
Status: COMPLETED | OUTPATIENT
Start: 2025-05-23 | End: 2025-05-24

## 2025-05-23 RX ADMIN — IOPAMIDOL 75 ML: 755 INJECTION, SOLUTION INTRAVENOUS at 23:45

## 2025-05-23 RX ADMIN — SODIUM CHLORIDE 1000 ML: 0.9 INJECTION, SOLUTION INTRAVENOUS at 22:55

## 2025-05-24 VITALS
RESPIRATION RATE: 18 BRPM | DIASTOLIC BLOOD PRESSURE: 82 MMHG | TEMPERATURE: 99 F | OXYGEN SATURATION: 96 % | SYSTOLIC BLOOD PRESSURE: 112 MMHG | HEART RATE: 73 BPM

## 2025-05-24 LAB
AMPHETAMINES UR QL SCN>1000 NG/ML: ABNORMAL
BACTERIA URNS QL MICRO: ABNORMAL /HPF
BARBITURATES UR QL SCN>200 NG/ML: ABNORMAL
BENZODIAZ UR QL SCN>200 NG/ML: POSITIVE
BILIRUB UR QL STRIP.AUTO: NEGATIVE
CANNABINOIDS UR QL SCN>50 NG/ML: ABNORMAL
CLARITY UR: CLEAR
COCAINE UR QL SCN: ABNORMAL
COLOR UR: YELLOW
DRUG SCREEN COMMENT UR-IMP: ABNORMAL
EKG ATRIAL RATE: 78 BPM
EKG DIAGNOSIS: NORMAL
EKG P AXIS: 52 DEGREES
EKG P-R INTERVAL: 148 MS
EKG Q-T INTERVAL: 382 MS
EKG QRS DURATION: 86 MS
EKG QTC CALCULATION (BAZETT): 435 MS
EKG R AXIS: 64 DEGREES
EKG T AXIS: 38 DEGREES
EKG VENTRICULAR RATE: 78 BPM
EPI CELLS #/AREA URNS HPF: ABNORMAL /HPF (ref 0–5)
FENTANYL SCREEN, URINE: POSITIVE
GLUCOSE UR STRIP.AUTO-MCNC: NEGATIVE MG/DL
HGB UR QL STRIP.AUTO: ABNORMAL
KETONES UR STRIP.AUTO-MCNC: NEGATIVE MG/DL
LACTATE BLDV-SCNC: 2.4 MMOL/L (ref 0.4–1.9)
LEUKOCYTE ESTERASE UR QL STRIP.AUTO: NEGATIVE
METHADONE UR QL SCN>300 NG/ML: POSITIVE
MUCOUS THREADS #/AREA URNS LPF: ABNORMAL /LPF
NITRITE UR QL STRIP.AUTO: NEGATIVE
OPIATES UR QL SCN>300 NG/ML: ABNORMAL
OXYCODONE UR QL SCN: ABNORMAL
PCP UR QL SCN>25 NG/ML: ABNORMAL
PH UR STRIP.AUTO: 5.5 [PH] (ref 5–8)
PH UR STRIP: 5.5 [PH]
PROT UR STRIP.AUTO-MCNC: NEGATIVE MG/DL
RBC #/AREA URNS HPF: ABNORMAL /HPF (ref 0–4)
SP GR UR STRIP.AUTO: 1.01 (ref 1–1.03)
UA COMPLETE W REFLEX CULTURE PNL UR: ABNORMAL
UA DIPSTICK W REFLEX MICRO PNL UR: YES
URN SPEC COLLECT METH UR: ABNORMAL
UROBILINOGEN UR STRIP-ACNC: 0.2 E.U./DL
WBC #/AREA URNS HPF: ABNORMAL /HPF (ref 0–5)

## 2025-05-24 PROCEDURE — 83605 ASSAY OF LACTIC ACID: CPT

## 2025-05-24 PROCEDURE — 2580000003 HC RX 258: Performed by: EMERGENCY MEDICINE

## 2025-05-24 PROCEDURE — 81001 URINALYSIS AUTO W/SCOPE: CPT

## 2025-05-24 PROCEDURE — 80307 DRUG TEST PRSMV CHEM ANLYZR: CPT

## 2025-05-24 PROCEDURE — 93010 ELECTROCARDIOGRAM REPORT: CPT | Performed by: INTERNAL MEDICINE

## 2025-05-24 PROCEDURE — 96361 HYDRATE IV INFUSION ADD-ON: CPT

## 2025-05-24 RX ORDER — QUETIAPINE FUMARATE 100 MG/1
100 TABLET, FILM COATED ORAL DAILY
COMMUNITY

## 2025-05-24 RX ORDER — LISINOPRIL 20 MG/1
20 TABLET ORAL DAILY
COMMUNITY

## 2025-05-24 RX ORDER — PAROXETINE 20 MG/1
20 TABLET, FILM COATED ORAL EVERY MORNING
COMMUNITY

## 2025-05-24 RX ORDER — 0.9 % SODIUM CHLORIDE 0.9 %
1000 INTRAVENOUS SOLUTION INTRAVENOUS ONCE
Status: COMPLETED | OUTPATIENT
Start: 2025-05-24 | End: 2025-05-24

## 2025-05-24 RX ADMIN — SODIUM CHLORIDE 1000 ML: 0.9 INJECTION, SOLUTION INTRAVENOUS at 02:26

## 2025-05-24 NOTE — ED NOTES
Pt discharged back home, reviewed discharged instructions with pt follow up care , pain control and return precautions discussed , pt verbalized understanding. Pt ambulated out of the department with steady gait. Pt left with his father .

## 2025-05-24 NOTE — DISCHARGE INSTRUCTIONS
No alcohol. No drugs. Fluids. Return for mental status changes, seizures, chest pain, trouble breathing, dizziness, persistent vomiting or other worsening symptoms. Return for thoughts of self harm. Followup with your drug treatment program

## 2025-05-24 NOTE — ED PROVIDER NOTES
[facility] EMERGENCY DEPT VISIT      Patient Identification  Edvin Heck is a 39 y.o. male.    Chief Complaint   Alcohol Intoxication (Pt was on the bus  noted to be intoxicated, seen stumbling  around thus  squad was called to bring to ED for evaluation. Upon arrival pt is  intoxicated,  is not a very good historian at this time, but  has no complaints at this time. )      History of Present Illness:    History was obtained from PATIENT.  Limitations to history:CONFUSED  This is a  39 y.o. male who presents via ambulance to the ED with complaints of possible intoxication.  Reportedly he was on the bus and seemed to be intoxicated and was stumbling around so EMS was called.  Patient is somnolent on arrival but arousable with tactile and verbal stimuli.  He is disoriented to time and place but will give his name.  He believes he came from his home where he lives with his dad. Denies alcohol use. Admits he used to have a problem with fentanyl but has been sober. Does not know what happened to get him to the hospital or how he got here. Denies pain. Denies headache. Denies trouble breathing or nausea and vomiting.     Past Medical History:   Diagnosis Date    Duodenitis     Hypertension        Past Surgical History:   Procedure Laterality Date    TONSILLECTOMY      WISDOM TOOTH EXTRACTION         No current facility-administered medications for this encounter.    Current Outpatient Medications:     FLUoxetine (PROZAC) 20 MG capsule, Take 1 capsule by mouth daily, Disp: , Rfl:     QUEtiapine (SEROQUEL) 100 MG tablet, Take 1 tablet by mouth daily, Disp: , Rfl:     lisinopril (PRINIVIL;ZESTRIL) 20 MG tablet, Take 1 tablet by mouth daily, Disp: , Rfl:     METHADONE HCL PO, Take by mouth daily Takes 135mg daily, Disp: , Rfl:     loperamide (IMODIUM) 2 MG capsule, Take 2 mg by mouth 4 times daily as needed for Diarrhea, Disp: , Rfl:     acetaminophen (TYLENOL) 500 MG tablet, Take 1 tablet by mouth every 6 hours as needed  ischemia.         Radiology:  All plain films have been evaluated by myself. They may have been overread by radiologist as noted in chart. Other radiologic studies (i.e. CT, MRI, ultrasounds, etc ) have been interpreted by radiologist.     CT HEAD WO CONTRAST   Final Result      No acute intracranial process.      Electronically signed by Gilles Morrison MD      CTA HEAD NECK W CONTRAST   Final Result      No atherosclerotic disease or evidence of dissection.            PROCEDURE: CT ANGIOGRAPHY HEAD WITH/WITHOUT CONTRAST      INDICATION: ALTERED MENTAL STATUS      COMPARISON: September 12, 2017      TECHNIQUE: Axial CT imaging obtained through the head prior to and following administration of IV contrast. Axial images, multiplanar reformatted images, and maximum intensity projection images were reviewed for CT angiographic technique.   IV contrast: 75 mL Isovue-370.      FINDINGS:      ANTERIOR CIRCULATION: The intracranial internal carotid arteries, anterior cerebral arteries, and middle cerebral arteries demonstrate no occlusion or stenosis. No evidence for aneurysm or arteriovenous malformation.      POSTERIOR CIRCULATION: The bilateral vertebral arteries, basilar artery and posterior cerebral arteries demonstrate no occlusion or stenosis. No evidence for aneurysm or arteriovenous malformation.      INTRACRANIAL VENOUS SYSTEM: No evidence for intracranial venous thrombosis.      INTRACRANIAL HEMORRHAGE: None.      VENTRICLES: Normal in size and configuration for age.      BRAIN PARENCHYMA: Gray-white matter differentiation is normal. No intracranial mass effect.      SKULL: No destructive osseous process or fracture.      PARANASAL SINUSES / MASTOIDS: No acute sinusitis or mastoiditis.      ORBITS: Normal.      EXTRACRANIAL SOFT TISSUES: Normal.      OTHER: None.      IMPRESSION:      No atherosclerotic disease of the intracranial circulation or large vessel occlusion.      Electronically signed by Gilles Morrison

## 2025-07-16 ENCOUNTER — HOSPITAL ENCOUNTER (EMERGENCY)
Age: 40
Discharge: HOME | End: 2025-07-16
Attending: EMERGENCY MEDICINE
Payer: COMMERCIAL

## 2025-07-16 VITALS
BODY MASS INDEX: 25.18 KG/M2 | TEMPERATURE: 98.6 F | HEIGHT: 73 IN | RESPIRATION RATE: 16 BRPM | HEART RATE: 65 BPM | DIASTOLIC BLOOD PRESSURE: 64 MMHG | WEIGHT: 190 LBS | SYSTOLIC BLOOD PRESSURE: 84 MMHG | OXYGEN SATURATION: 93 %

## 2025-07-16 DIAGNOSIS — T42.4X1A BENZODIAZEPINE OVERDOSE, ACCIDENTAL OR UNINTENTIONAL, INITIAL ENCOUNTER: Primary | ICD-10-CM

## 2025-07-16 PROCEDURE — 6360000002 HC RX W HCPCS: Performed by: EMERGENCY MEDICINE

## 2025-07-16 PROCEDURE — 96374 THER/PROPH/DIAG INJ IV PUSH: CPT

## 2025-07-16 PROCEDURE — 99284 EMERGENCY DEPT VISIT MOD MDM: CPT

## 2025-07-16 PROCEDURE — 2500000003 HC RX 250 WO HCPCS: Performed by: EMERGENCY MEDICINE

## 2025-07-16 RX ORDER — FLUMAZENIL 0.1 MG/ML
0.2 INJECTION INTRAVENOUS ONCE
Status: COMPLETED | OUTPATIENT
Start: 2025-07-16 | End: 2025-07-16

## 2025-07-16 RX ORDER — NALOXONE HYDROCHLORIDE 1 MG/ML
1 INJECTION INTRAMUSCULAR; INTRAVENOUS; SUBCUTANEOUS ONCE
Status: COMPLETED | OUTPATIENT
Start: 2025-07-16 | End: 2025-07-16

## 2025-07-16 RX ADMIN — NALOXONE HYDROCHLORIDE 1 MG: 1 INJECTION INTRAMUSCULAR; INTRAVENOUS; SUBCUTANEOUS at 12:45

## 2025-07-16 RX ADMIN — FLUMAZENIL 0.2 MG: 0.1 INJECTION, SOLUTION INTRAVENOUS at 13:47

## 2025-07-16 ASSESSMENT — PAIN - FUNCTIONAL ASSESSMENT: PAIN_FUNCTIONAL_ASSESSMENT: NONE - DENIES PAIN

## 2025-07-16 NOTE — ED TRIAGE NOTES
Pt brought in by EMS for possible drug overdose. Pt arousable to painful stimuli, O2 92% on RA. , per EMS.

## 2025-07-17 NOTE — ED PROVIDER NOTES
Emergency Department Encounter    Patient: Edvin Heck  MRN: [id number]  : 1985  Date of Evaluation: 2025  ED Provider:  ARTURO MAYNARD MD      Triage Chief Complaint:   Drug Overdose (EMS called by pt's father. Pt found semi-responsive in car, possible drug overdose.)    [place]:  Edvin Heck is a 39 y.o. male that presents After possible combined opioid and benzodiazepine overdose, patient was using, next thing they remember is EMSs there, patient was given Narcan and woke up, currently has no significant complaints.  Patient has had a history of chronic opioid use and abuse was recently started on methadone he has not his methadone for 2 days, he abuses Xanax frequently, he had midpoint pupils on arrival severe significant sedation no severe hypoxia no apnea    ROS - see HPI, below listed is current ROS at time of my eval:  General:  No fevers, no chills, no weakness  Eyes:  No recent vison changes, no discharge  ENT:  No sore throat, no nasal congestion, no hearing changes  Cardiovascular:  No chest pain, no palpitations  Respiratory:  No shortness of breath, no cough  Gastrointestinal:  No pain, no nausea, no vomiting  Musculoskeletal:  No muscle pain, no joint pain  Skin:  No rash, no pruritis, no easy bruising  Neurologic:  No speech problems, no headache  Psychiatric:  No anxiety  Genitourinary:  No dysuria, no hematuria  Extremities:  no edema, no pain    Past Medical History:   Diagnosis Date    Duodenitis     Hypertension      Past Surgical History:   Procedure Laterality Date    TONSILLECTOMY      WISDOM TOOTH EXTRACTION       History reviewed. No pertinent family history.  Social History     Socioeconomic History    Marital status:      Spouse name: Not on file    Number of children: Not on file    Years of education: Not on file    Highest education level: Not on file   Occupational History    Not on file   Tobacco Use    Smoking status: Every Day     Current